# Patient Record
Sex: MALE | Race: WHITE | Employment: FULL TIME | URBAN - NONMETROPOLITAN AREA
[De-identification: names, ages, dates, MRNs, and addresses within clinical notes are randomized per-mention and may not be internally consistent; named-entity substitution may affect disease eponyms.]

---

## 2023-10-24 ENCOUNTER — HOSPITAL ENCOUNTER (OUTPATIENT)
Dept: GENERAL RADIOLOGY | Age: 38
Discharge: HOME | End: 2023-10-24
Payer: COMMERCIAL

## 2023-10-24 ENCOUNTER — HOSPITAL ENCOUNTER (OUTPATIENT)
Age: 38
Discharge: HOME | End: 2023-10-24
Payer: COMMERCIAL

## 2023-10-24 DIAGNOSIS — R07.9 CHEST PAIN, UNSPECIFIED TYPE: ICD-10-CM

## 2023-10-24 DIAGNOSIS — R05.1 ACUTE COUGH: ICD-10-CM

## 2023-10-24 PROCEDURE — 71046 X-RAY EXAM CHEST 2 VIEWS: CPT

## 2023-11-13 PROBLEM — K21.9 GASTROESOPHAGEAL REFLUX DISEASE WITHOUT ESOPHAGITIS: Status: ACTIVE | Noted: 2023-11-13

## 2023-11-20 ENCOUNTER — HOSPITAL ENCOUNTER (OUTPATIENT)
Dept: CT IMAGING | Age: 38
Discharge: HOME | End: 2023-11-20
Payer: COMMERCIAL

## 2023-11-20 DIAGNOSIS — K21.9 GASTROESOPHAGEAL REFLUX DISEASE WITHOUT ESOPHAGITIS: ICD-10-CM

## 2023-11-20 DIAGNOSIS — R07.89 CHEST TIGHTNESS: ICD-10-CM

## 2023-11-20 PROCEDURE — 70491 CT SOFT TISSUE NECK W/DYE: CPT

## 2023-11-20 PROCEDURE — 71260 CT THORAX DX C+: CPT

## 2023-11-20 PROCEDURE — 6360000004 HC RX CONTRAST MEDICATION: Performed by: NURSE PRACTITIONER

## 2023-11-20 RX ADMIN — IOPAMIDOL 75 ML: 755 INJECTION, SOLUTION INTRAVENOUS at 16:00

## 2024-01-10 ENCOUNTER — OFFICE VISIT (OUTPATIENT)
Dept: PULMONOLOGY | Age: 39
End: 2024-01-10
Payer: COMMERCIAL

## 2024-01-10 VITALS
WEIGHT: 244 LBS | HEART RATE: 75 BPM | HEIGHT: 78 IN | DIASTOLIC BLOOD PRESSURE: 74 MMHG | SYSTOLIC BLOOD PRESSURE: 110 MMHG | TEMPERATURE: 97.8 F | OXYGEN SATURATION: 98 % | BODY MASS INDEX: 28.23 KG/M2

## 2024-01-10 DIAGNOSIS — I89.8 CALCIFIED LYMPH NODES: Primary | ICD-10-CM

## 2024-01-10 PROCEDURE — 99203 OFFICE O/P NEW LOW 30 MIN: CPT | Performed by: INTERNAL MEDICINE

## 2024-01-10 NOTE — PROGRESS NOTES
Subjective:      Patient ID: Anant Hess is a 38 y.o. male.      CC: Calcified para-tracheal adenopathy    HPI      Anant is a very healthy and fit looking gentleman with a history of GERD who complaining of requiring chest tightness, productive cough requiring short courses of steroids and antibiotics and eventually CT scan of the neck and chest were completed on 11/20/2023 revealing calcified mediastinal and hilar suggesting prior granulomatous disease, lung parenchyma is normal    Anant is back to his normal self, his follow-up by Dr. 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    Current review of systems is positive for improving heartburn and really negative for the respiratory system, denies dyspnea, dyspnea on exertion, cough, sputum production, chest tightness, wheezing    Anant reports some environmental allergies which are controlled with fexofenadine    Review of Systems   Constitutional: Negative.    HENT: Negative.     Respiratory: Negative.     Cardiovascular: Negative.    Gastrointestinal:         Heartburn   Allergic/Immunologic: Negative.    Neurological: Negative.          All other systems reviewed and are negative    Lung Nodule Screening     [] Qualifies    [x] Does not qualify   [] Declined   [] Completed  Past Medical History:   Diagnosis Date    Atrial fibrillation, unspecified type (HCC)     when he was 18 years old     Past Surgical History:   Procedure Laterality Date    VASECTOMY  12/2020     Social History     Tobacco Use    Smoking status: Never    Smokeless tobacco: Never    Tobacco comments:     Never smoker    Vaping Use    Vaping

## 2024-01-11 ASSESSMENT — ENCOUNTER SYMPTOMS
ALLERGIC/IMMUNOLOGIC NEGATIVE: 1
RESPIRATORY NEGATIVE: 1